# Patient Record
(demographics unavailable — no encounter records)

---

## 2025-01-29 NOTE — ASSESSMENT
[FreeTextEntry1] : Patient with previous claudication symptoms continues to have resolution despite some element of perhaps in-stent stenosis with a stable complaint of 6 months ago.  Will continue her monitoring I will have him follow-up in 6 months with an arterial duplex.

## 2025-01-29 NOTE — HISTORY OF PRESENT ILLNESS
[de-identified] : Patient here for follow-up doing well had previous left lower extremity claudication symptoms status post intervention with stenting.  He has known residual stenosis currently having no symptoms.

## 2025-01-29 NOTE — PHYSICAL EXAM
[Respiratory Effort] : normal respiratory effort [de-identified] : Appears well [FreeTextEntry1] : Doppler distal signals [de-identified] :  No evidence of wound on either foot

## 2025-06-12 NOTE — HISTORY OF PRESENT ILLNESS
[de-identified] : recurrent incisional hernia [de-identified] : Pt doing well, denies any pain in the area of the hernia, Normal GI functioning, No fevers or chills.

## 2025-06-12 NOTE — PHYSICAL EXAM
[Normal Breath Sounds] : Normal breath sounds [Normal Heart Sounds] : normal heart sounds [Calm] : calm [de-identified] : well developed male in no distress [de-identified] : normal bowel sounds, without distension or tenderness, Large soft, reducible recurrent incisional hernia,without tenderness.